# Patient Record
Sex: MALE | Race: WHITE
[De-identification: names, ages, dates, MRNs, and addresses within clinical notes are randomized per-mention and may not be internally consistent; named-entity substitution may affect disease eponyms.]

---

## 2019-06-25 ENCOUNTER — HOSPITAL ENCOUNTER (OUTPATIENT)
Dept: HOSPITAL 38 - CC.FCMC | Age: 64
Setting detail: OBSERVATION
LOS: 2 days | Discharge: HOME | End: 2019-06-27
Attending: FAMILY MEDICINE | Admitting: FAMILY MEDICINE
Payer: MEDICARE

## 2019-06-25 DIAGNOSIS — H91.92: ICD-10-CM

## 2019-06-25 DIAGNOSIS — K21.9: ICD-10-CM

## 2019-06-25 DIAGNOSIS — R79.89: ICD-10-CM

## 2019-06-25 DIAGNOSIS — Z79.82: ICD-10-CM

## 2019-06-25 DIAGNOSIS — Z88.1: ICD-10-CM

## 2019-06-25 DIAGNOSIS — M10.9: ICD-10-CM

## 2019-06-25 DIAGNOSIS — Z88.8: ICD-10-CM

## 2019-06-25 DIAGNOSIS — Z88.5: ICD-10-CM

## 2019-06-25 DIAGNOSIS — E78.5: ICD-10-CM

## 2019-06-25 DIAGNOSIS — J20.9: Primary | ICD-10-CM

## 2019-06-25 DIAGNOSIS — Z98.890: ICD-10-CM

## 2019-06-25 DIAGNOSIS — Z79.899: ICD-10-CM

## 2019-06-25 LAB
CHLORIDE SERPL-SCNC: 106 MEQ/L (ref 98–106)
SODIUM SERPL-SCNC: 143 MEQ/L (ref 136–145)

## 2019-06-25 PROCEDURE — G0378 HOSPITAL OBSERVATION PER HR: HCPCS

## 2019-06-25 PROCEDURE — G0379 DIRECT REFER HOSPITAL OBSERV: HCPCS

## 2019-06-25 RX ADMIN — PRAMIPEXOLE DIHYDROCHLORIDE SCH: 0.5 TABLET ORAL at 22:32

## 2019-06-25 RX ADMIN — METHYLPREDNISOLONE SODIUM SUCCINATE SCH MG: 125 INJECTION, POWDER, FOR SOLUTION INTRAMUSCULAR; INTRAVENOUS at 20:48

## 2019-06-25 RX ADMIN — TAMSULOSIN HYDROCHLORIDE SCH: 0.4 CAPSULE ORAL at 20:45

## 2019-06-26 LAB
CHLORIDE SERPL-SCNC: 104 MEQ/L (ref 98–106)
SODIUM SERPL-SCNC: 140 MEQ/L (ref 136–145)

## 2019-06-26 RX ADMIN — PRAMIPEXOLE DIHYDROCHLORIDE SCH MG: 0.5 TABLET ORAL at 21:36

## 2019-06-26 RX ADMIN — PRAMIPEXOLE DIHYDROCHLORIDE SCH MG: 0.5 TABLET ORAL at 16:11

## 2019-06-26 RX ADMIN — METHYLPREDNISOLONE SODIUM SUCCINATE SCH MG: 125 INJECTION, POWDER, FOR SOLUTION INTRAMUSCULAR; INTRAVENOUS at 07:39

## 2019-06-26 RX ADMIN — METHYLPREDNISOLONE SODIUM SUCCINATE SCH MG: 125 INJECTION, POWDER, FOR SOLUTION INTRAMUSCULAR; INTRAVENOUS at 19:46

## 2019-06-26 RX ADMIN — TAMSULOSIN HYDROCHLORIDE SCH MG: 0.4 CAPSULE ORAL at 19:40

## 2019-06-26 NOTE — PCM.PN
- General Info


Date of Service: 06/26/19


Admission Dx/Problem (Free Text): 





Acute Bronchitis


Functional Status: Reports: Pain Controlled, Tolerating Diet, Ambulating





- Review of Systems


General: Reports: Fever, Weakness, Fatigue, Malaise


HEENT: Reports: Post Nasal Drip, Sinus Congestion, Rhinitis, Other (hoarseness)

.  Denies: Ear Pain, Sore Throat


Pulmonary: Reports: Shortness of Breath, Cough.  Denies: Wheezing


Cardiovascular: Denies: Chest Pain, Edema, Lightheadedness


Gastrointestinal: Reports: Nausea, Vomiting.  Denies: Abdominal Pain


Genitourinary: Reports: No Symptoms


Musculoskeletal: Reports: No Symptoms


Skin: Reports: No Symptoms


Neurological: Reports: No Symptoms


Psychiatric: Reports: No Symptoms





- Patient Data


Vitals - Most Recent: 


 Last Vital Signs











Temp  98.3 F   06/26/19 15:37


 


Pulse  100   06/26/19 15:37


 


Resp  20   06/26/19 15:37


 


BP  129/61   06/26/19 15:37


 


Pulse Ox  95   06/26/19 15:37











Weight - Most Recent: 235 lb


Lab Results Last 24 Hours: 


 Laboratory Results - last 24 hr











  06/26/19 06/26/19 Range/Units





  06:50 06:50 


 


WBC  10.7 H   (5.0-10.0)  10^3/uL


 


RBC  5.10   (4.50-6.00)  10^6/uL


 


Hgb  16.8   (14.0-18.0)  g/dL


 


Hct  47.6   (40.0-54.0)  %


 


MCV  93.3   (82.0-94.0)  fL


 


MCH  32.9 H   (27.0-32.0)  pg


 


MCHC  35.3   (33.0-38.0)  g/dL


 


RDW Coeff of Gaye  12.7   (11.0-15.0)  %


 


Plt Count  212   (150-400)  10^3/uL


 


Add Manual Diff  Yes   


 


Neutrophils % (Manual)  84   (35-85)  %


 


Lymphocytes % (Manual)  16 L   (21-55)  %


 


Absolute Neutrophils  8.99 H   (1.80-7.00)  10^3/uL


 


Lymphocytes # (Manual)  1.71   (1.00-4.80)  10^3/uL


 


Sodium   140  (136-145)  mEq/L


 


Potassium   4.4  (3.5-5.0)  mEq/L


 


Chloride   104  ()  mEq/L


 


Carbon Dioxide   24  (21-32)  mmol/L


 


BUN   17  (7-18)  mg/dL


 


Creatinine   1.1  (0.7-1.3)  mg/dL


 


Est Cr Clr Drug Dosing   66.06  mL/min


 


Estimated GFR (MDRD)   > 60  (>=60)  mL/min


 


Glucose   158 H  (75-99)  mg/dL


 


Calcium   9.1  (8.4-10.1)  mg/dL


 


C-Reactive Protein   < 0.2 L  (0.2-0.8)  mg/dL











Med Orders - Current: 


 Current Medications





Albuterol (Proventil Neb Soln)  2.5 mg NEB Q4H PRN


   PRN Reason: Dyspnea


Albuterol/Ipratropium (Duoneb 3.0-0.5 Mg/3 Ml)  3 ml NEB QIDRT [facility]


   Last Admin: 06/26/19 16:10 Dose:  3 ml


Allopurinol (Zyloprim)  200 mg PO DAILY [facility]


   Last Admin: 06/26/19 10:22 Dose:  Not Given


Aspirin (Halfprin)  81 mg PO BEDTIME [facility]


   Last Admin: 06/25/19 21:42 Dose:  81 mg


Ceftriaxone Sodium (Rocephin)  1 gm IVPUSH Q24H [facility]


   Last Admin: 06/25/19 20:49 Dose:  1 gm


Enoxaparin Sodium (Lovenox)  40 mg SUBCUT Q24H [facility]


   Last Admin: 06/25/19 20:46 Dose:  40 mg


Guaifenesin (Organ-I Nr)  600 mg PO BID [facility]


Magnesium Hydroxide (Milk Of Magnesia)  30 ml PO Q12H PRN


   PRN Reason: Constipation


Methylprednisolone Sodium Succinate (Solu-Medrol)  62.5 mg IVPUSH Q12H [facility]


   Last Admin: 06/26/19 07:39 Dose:  62.5 mg


**Ptom** Rosuvastatin [ Crestor] 10mg  1 each PO BEDTIME [facility]


Ondansetron HCl (Zofran Odt)  4 mg PO Q4H PRN


   PRN Reason: nausea, able to take PO


Ondansetron HCl (Zofran)  4 mg IV Q4H PRN


   PRN Reason: Nausea/Vomiting


Pramipexole Dihydrochloride (Mirapex)  1 mg PO 1700,2200 [facility]


   Last Admin: 06/26/19 16:11 Dose:  1 mg


Sodium Chloride (Saline Flush)  10 ml FLUSH ASDIRECTED PRN


   PRN Reason: Keep Vein Open


Tamsulosin HCl (Flomax)  0.4 mg PO BEDTIME [facility]


   Last Admin: 06/25/19 20:45 Dose:  Not Given


Temazepam (Restoril)  15 mg PO BEDTIME PRN


   PRN Reason: Sleep





Discontinued Medications





Azithromycin (Zithromax)  500 mg PO DAILY@2000 [facility]


Iopamidol (Isovue-370 (76%))  100 ml IVPUSH ONETIME ONE


   Stop: 06/25/19 16:36


   Last Admin: 06/25/19 16:49 Dose:  100 ml


Iopamidol (Isovue-370 (76%))  80 ml IVPUSH ONETIME ONE


   Stop: 06/25/19 17:05


   Last Admin: 06/25/19 17:13 Dose:  80 ml


Simvastatin (Zocor)  40 mg PO BEDTIME [facility]


   Last Admin: 06/25/19 20:53 Dose:  Not Given











- Exam


General: Alert, Oriented


HEENT: Mucous Membr. Moist/Pink


Neck: Supple


Lungs: Decreased Breath Sounds


Cardiovascular: Regular Rate, Regular Rhythm


GI/Abdominal Exam: Normal Bowel Sounds, Soft, Non-Tender


Extremities: Normal Inspection, No Pedal Edema


Skin: Warm, Dry


Neurological: No New Focal Deficit





- Problem List & Annotations


(1) Bronchitis


SNOMED Code(s): 12476544


   Code(s): J40 - BRONCHITIS, NOT SPECIFIED AS ACUTE OR CHRONIC   Status: Acute

   Priority: High   Current Visit: Yes   





- Problem List Review


Problem List Initiated/Reviewed/Updated: Yes





- My Orders


Last 24 Hours: 


My Active Orders





06/25/19 18:58


Patient Status [ADT] Routine 


Oxygen Therapy [RC] .PRN 


Up ad Adriane [RC] .PRN 


Vital Signs [RC] 0000,0400,0800,1200,1600,2000 


Albuterol [Proventil Neb Soln]   2.5 mg NEB Q4H PRN 


Magnesium Hydroxide [Milk of Magnesia]   30 ml PO Q12H PRN 


Ondansetron [Zofran ODT]   4 mg PO Q4H PRN 


Ondansetron [Zofran]   4 mg IV Q4H PRN 


Sodium Chloride 0.9% [Saline Flush]   10 ml FLUSH ASDIRECTED PRN 


Temazepam [Restoril]   15 mg PO BEDTIME PRN 


Saline Lock Insert [OM.PC] Routine 


Resuscitation Status Routine 





06/25/19 19:01


RT Aerosol Therapy [RC] 0800,1200,1600,2000 06/25/19 20:00


Albuterol/Ipratropium [DuoNeb 3.0-0.5 MG/3 ML]   3 ml NEB QIDRT 


Aspirin [Halfprin]   81 mg PO BEDTIME 


Enoxaparin [Lovenox]   40 mg SUBCUT Q24H 


Tamsulosin [Flomax]   0.4 mg PO BEDTIME 


cefTRIAXone [Rocephin]   1 gm IVPUSH Q24H 


methylPREDNISolone Sod Succ [Solu-MEDROL]   62.5 mg IVPUSH Q12H 





06/25/19 22:00


Pramipexole [Mirapex]   1 mg PO 1700,2200 





06/26/19 08:00


Allopurinol [Zyloprim]   200 mg PO DAILY 





06/26/19 20:00


Non-Formulary Medication [NF Drug]   1 each PO BEDTIME 


guaiFENesin [Organ-I NR]   600 mg PO BID 





06/26/19 Breakfast


Regular Diet [DIET] 





06/27/19 05:11


BASIC METABOLIC PANEL,BMP [CHEM] AM 


C-REACTIVE PROTEIN [CHEM] AM 


CBC WITH AUTO DIFF [HEME] AM 














- Assessment


Assessment:: 





Acute Bronchitis





- Plan


Plan:: 





Patient admits to not feeling much better today.  Continues to have frequent 

cough, productive at times.  States abdomen and chest are "sore" from coughing.

  Was nauseated last evening, did have emesis.  Low grade fevers.  Feels mildly 

short of breath with activity, oxygen sats are good.  Labs show mild elevation 

now of WBC at 10.7, likely due to steroids as no left shift, CRP negative.  





Will continue with current IV meds.  Add Mucinex.  Obtain fungal panel as 

patient as had persistent symptoms for the last 3 weeks.  Repeat labs in am.  

Possible discharge home tomorrow if improved.

## 2019-06-27 VITALS — DIASTOLIC BLOOD PRESSURE: 80 MMHG | SYSTOLIC BLOOD PRESSURE: 127 MMHG

## 2019-06-27 LAB
CHLORIDE SERPL-SCNC: 105 MEQ/L (ref 98–106)
SODIUM SERPL-SCNC: 140 MEQ/L (ref 136–145)

## 2019-06-27 RX ADMIN — METHYLPREDNISOLONE SODIUM SUCCINATE SCH MG: 125 INJECTION, POWDER, FOR SOLUTION INTRAMUSCULAR; INTRAVENOUS at 08:16

## 2019-06-27 NOTE — PCM.DCSUM1
**Discharge Summary





- Hospital Course


Free Text/Narrative:: 





Patient presented to Dr. England for ongoing cough.  Has pain in abdomen and chest 

wall due to frequency of harsh cough.  Has been treated x2 with Ceftin and 

Doxycycline, course of prednisone and prometh with codeine.  Has just not felt 

any improvement.  No fevers.  Labs were done, normal CBC, P8 and negative CRP.  

Chest xray unremarkable.  D-Dimer was elevated at 3.7.  Admitted and CTA of 

chest done.  Started on IV Rocephin and steroids.  Nebs QID


Diagnosis: Stroke: No


Modified Mayes Scale: No Symptoms at All


Modified Amaris Scale Score: 0





- Discharge Data


Discharge Date: 06/27/19


Discharge Disposition: Home, Self-Care 01


Condition: Good





- Discharge Diagnosis/Problem(s)


(1) Bronchitis


SNOMED Code(s): 08287638


   ICD Code: J40 - BRONCHITIS, NOT SPECIFIED AS ACUTE OR CHRONIC   Status: 

Acute   Priority: High   





- Patient Summary/Data


Complications: none


Hospital Course: 





Patient feeling much of the same but "feels ready to be home".  Has not seen 

much improvement yet from the steroids or nebs.  Labs have all remained stable.

  CT scan negative.  Has remained afebrile.  Still does have frequent cough.  

Are obtaining a fungal panel to rule out other source of ongoing cough.





Will discharge home on Mucinex BID and nebs for the next week.  Follow up with 

Dr. England in 2 weeks.





- Patient Instructions


Diet: Usual Diet as Tolerated


Activity: As Tolerated





- Discharge Plan


*PRESCRIPTION DRUG MONITORING PROGRAM REVIEWED*: No


*COPY OF PRESCRIPTION DRUG MONITORING REPORT IN PATIENT DAMION: No


Prescriptions/Med Rec: 


Albuterol/Ipratropium [DuoNeb 3.0-0.5 MG/3 ML] 3 ml NEB QIDRT #28 neb


guaiFENesin [Organ-I NR] 600 mg PO BID #20 tablet


Home Medications: 


 Home Meds





Aspirin [Halfprin] 81 mg PO BEDTIME 04/22/15 [History]


Pramipexole Di-HCl [Mirapex] 1 mg PO 1700,2200 04/22/15 [History]


Rosuvastatin Calcium [Crestor] 10 mg PO BEDTIME 04/22/15 [History]


Allopurinol [Zyloprim] 200 mg PO DAILY 06/25/19 [History]


Tamsulosin [Flomax] 0.4 mg PO BEDTIME 06/25/19 [History]


Albuterol/Ipratropium [DuoNeb 3.0-0.5 MG/3 ML] 3 ml NEB QIDRT #28 neb 06/27/19 [

Rx]


guaiFENesin [Organ-I NR] 600 mg PO BID #20 tablet 06/27/19 [Rx]








Patient Handouts:  Acute Bronchitis, Adult


Referrals: 


Ricardo England MD [Primary Care Provider] -  (Recheck in 2 weeks with Dr. England)





- Discharge Summary/Plan Comment


DC Time >30 min.: No





- General Info


Date of Service: 06/27/19


Admission Dx/Problem (Free Text: 





Acute Bronchitis


Functional Status: Reports: Pain Controlled, Tolerating Diet, Ambulating





- Review of Systems


General: Reports: Malaise.  Denies: Fever, Weakness, Fatigue


HEENT: Reports: Rhinitis.  Denies: Sore Throat


Pulmonary: Reports: Shortness of Breath, Cough.  Denies: Wheezing


Cardiovascular: Denies: Chest Pain, Edema, Lightheadedness


Gastrointestinal: Denies: Abdominal Pain, Nausea, Vomiting


Genitourinary: Reports: No Symptoms


Musculoskeletal: Reports: No Symptoms


Skin: Reports: No Symptoms


Neurological: Reports: No Symptoms


Psychiatric: Reports: No Symptoms





- Patient Data


Vitals - Most Recent: 


 Last Vital Signs











Temp  98.1 F   06/27/19 08:00


 


Pulse  94   06/27/19 08:00


 


Resp  20   06/27/19 08:00


 


BP  127/80   06/27/19 08:00


 


Pulse Ox  93 L  06/27/19 08:00











Weight - Most Recent: 235 lb


Lab Results - Last 24 hrs: 


 Laboratory Results - last 24 hr











  06/27/19 06/27/19 Range/Units





  07:05 07:05 


 


WBC  16.5 H   (5.0-10.0)  10^3/uL


 


RBC  4.64   (4.50-6.00)  10^6/uL


 


Hgb  15.5   (14.0-18.0)  g/dL


 


Hct  43.6   (40.0-54.0)  %


 


MCV  94.0   (82.0-94.0)  fL


 


MCH  33.4 H   (27.0-32.0)  pg


 


MCHC  35.6   (33.0-38.0)  g/dL


 


RDW Coeff of Gaye  12.8   (11.0-15.0)  %


 


Plt Count  198   (150-400)  10^3/uL


 


Neut % (Auto)  89.1 H   (35-85)  %


 


Lymph % (Auto)  6.1 L   (10-55)  %


 


Mono % (Auto)  4.8   (0-16)  %


 


Eos % (Auto)  0   (0-5)  %


 


Baso % (Auto)  0   (0-3)  %


 


Neut # (Auto)  14.70 H   (1.80-7.00)  10^3/uL


 


Lymph # (Auto)  1.00   (1.00-4.80)  10^3/uL


 


Mono # (Auto)  0.79   (0.00-0.80)  10^3/uL


 


Eos # (Auto)  0.00   (0.00-0.45)  10^3/uL


 


Baso # (Auto)  0.00   10^3/uL


 


Sodium   140  (136-145)  mEq/L


 


Potassium   4.7  (3.5-5.0)  mEq/L


 


Chloride   105  ()  mEq/L


 


Carbon Dioxide   24  (21-32)  mmol/L


 


BUN   23 H  (7-18)  mg/dL


 


Creatinine   1.0  (0.7-1.3)  mg/dL


 


Est Cr Clr Drug Dosing   72.66  mL/min


 


Estimated GFR (MDRD)   > 60  (>=60)  mL/min


 


Glucose   162 H  (75-99)  mg/dL


 


Calcium   9.4  (8.4-10.1)  mg/dL


 


C-Reactive Protein   < 0.2 L  (0.2-0.8)  mg/dL











Med Orders - Current: 


 Current Medications








Discontinued Medications





Acetaminophen (Tylenol)  650 mg PO Q6H PRN


   PRN Reason: Temperature


Albuterol (Proventil Neb Soln)  2.5 mg NEB Q4H PRN


   PRN Reason: Dyspnea


Albuterol/Ipratropium (Duoneb 3.0-0.5 Mg/3 Ml)  3 ml NEB QIDRT [facility]


   Last Admin: 06/27/19 08:12 Dose:  3 ml


Allopurinol (Zyloprim)  200 mg PO DAILY [facility]


   Last Admin: 06/26/19 10:22 Dose:  Not Given


Aspirin (Halfprin)  81 mg PO BEDTIME [facility]


   Last Admin: 06/26/19 19:40 Dose:  81 mg


Azithromycin (Zithromax)  500 mg PO DAILY@2000 [facility]


Ceftriaxone Sodium (Rocephin)  1 gm IVPUSH Q24H [facility]


   Last Admin: 06/26/19 19:47 Dose:  1 gm


Enoxaparin Sodium (Lovenox)  40 mg SUBCUT Q24H [facility]


   Last Admin: 06/26/19 19:42 Dose:  40 mg


Guaifenesin (Organ-I Nr)  600 mg PO BID [facility]


   Last Admin: 06/27/19 08:12 Dose:  600 mg


Iopamidol (Isovue-370 (76%))  100 ml IVPUSH ONETIME ONE


   Stop: 06/25/19 16:36


   Last Admin: 06/25/19 16:49 Dose:  100 ml


Iopamidol (Isovue-370 (76%))  80 ml IVPUSH ONETIME ONE


   Stop: 06/25/19 17:05


   Last Admin: 06/25/19 17:13 Dose:  80 ml


Magnesium Hydroxide (Milk Of Magnesia)  30 ml PO Q12H PRN


   PRN Reason: Constipation


Methylprednisolone Sodium Succinate (Solu-Medrol)  62.5 mg IVPUSH Q12H [facility]


   Last Admin: 06/27/19 08:16 Dose:  62.5 mg


**Ptom** Rosuvastatin [ Crestor] 10mg  1 each PO BEDTIME [facility]


   Last Admin: 06/26/19 19:41 Dose:  1 each


Ondansetron HCl (Zofran Odt)  4 mg PO Q4H PRN


   PRN Reason: nausea, able to take PO


Ondansetron HCl (Zofran)  4 mg IV Q4H PRN


   PRN Reason: Nausea/Vomiting


Pramipexole Dihydrochloride (Mirapex)  1 mg PO 1700,2200 [facility]


   Last Admin: 06/26/19 21:36 Dose:  1 mg


Simvastatin (Zocor)  40 mg PO BEDTIME [facility]


   Last Admin: 06/25/19 20:53 Dose:  Not Given


Sodium Chloride (Saline Flush)  10 ml FLUSH ASDIRECTED PRN


   PRN Reason: Keep Vein Open


Tamsulosin HCl (Flomax)  0.4 mg PO BEDTIME [facility]


   Last Admin: 06/26/19 19:40 Dose:  0.4 mg


Temazepam (Restoril)  15 mg PO BEDTIME PRN


   PRN Reason: Sleep











- Exam


General: Reports: Alert, Oriented


HEENT: Reports: Mucous Membr. Moist/Pink


Neck: Reports: Supple


Lungs: Reports: Clear to Auscultation, Normal Respiratory Effort


Cardiovascular: Reports: Regular Rate, Regular Rhythm


GI/Abdominal Exam: Normal Bowel Sounds, Soft, Non-Tender


Extremities: Normal Inspection, No Pedal Edema


Skin: Reports: Warm, Dry


Neurological: Reports: No New Focal Deficit